# Patient Record
Sex: MALE | Race: BLACK OR AFRICAN AMERICAN | NOT HISPANIC OR LATINO | URBAN - METROPOLITAN AREA
[De-identification: names, ages, dates, MRNs, and addresses within clinical notes are randomized per-mention and may not be internally consistent; named-entity substitution may affect disease eponyms.]

---

## 2022-07-11 ENCOUNTER — HOSPITAL ENCOUNTER (EMERGENCY)
Facility: HOSPITAL | Age: 32
Discharge: HOME | End: 2022-07-11
Attending: STUDENT IN AN ORGANIZED HEALTH CARE EDUCATION/TRAINING PROGRAM

## 2022-07-11 VITALS
DIASTOLIC BLOOD PRESSURE: 91 MMHG | HEIGHT: 66 IN | RESPIRATION RATE: 16 BRPM | BODY MASS INDEX: 27.32 KG/M2 | SYSTOLIC BLOOD PRESSURE: 151 MMHG | HEART RATE: 76 BPM | OXYGEN SATURATION: 99 % | TEMPERATURE: 98 F | WEIGHT: 170 LBS

## 2022-07-11 DIAGNOSIS — K04.7 DENTAL ABSCESS: Primary | ICD-10-CM

## 2022-07-11 PROCEDURE — 99284 EMERGENCY DEPT VISIT MOD MDM: CPT

## 2022-07-11 RX ORDER — PENICILLIN V POTASSIUM 500 MG/1
500 TABLET, FILM COATED ORAL EVERY 6 HOURS
Qty: 28 TABLET | Refills: 0 | Status: SHIPPED | OUTPATIENT
Start: 2022-07-11 | End: 2023-01-14 | Stop reason: ALTCHOICE

## 2022-07-11 RX ORDER — HYDROCODONE BITARTRATE AND ACETAMINOPHEN 5; 325 MG/1; MG/1
1 TABLET ORAL EVERY 6 HOURS PRN
Qty: 12 TABLET | Refills: 0 | Status: SHIPPED | OUTPATIENT
Start: 2022-07-11 | End: 2023-01-14 | Stop reason: ALTCHOICE

## 2022-07-11 NOTE — ED NOTES
Pt states he has two top right dental abscesses and also c/o top left toothache, pt state he has not been able to eat x 2 days d/t the dental pain.

## 2022-07-11 NOTE — ED PROVIDER NOTES
Encounter Date: 7/11/2022          History     Chief Complaint   Patient presents with    Dental Pain     Papito upper dental pain     31 Male presents to Ed with pain to R upper teeth/jaw and L lower jaw. Saw a dentist 2-3 months ago and took PO abx , pain resolved shortly after and now has come back past 2-3 days. Needs t FU with dentist but n longer has insuranceTook ibuprofen 800mg without relief.        Review of patient's allergies indicates:  No Known Allergies  No past medical history on file.  No past surgical history on file.  No family history on file.     Review of Systems   Constitutional: Negative for fever.   HENT: Negative for sore throat.         Teeth/jaw pain   Respiratory: Negative for shortness of breath.    Cardiovascular: Negative for chest pain.   Gastrointestinal: Negative for nausea.   Genitourinary: Negative for dysuria.   Musculoskeletal: Negative for back pain.   Skin: Negative for rash.   Neurological: Negative for weakness.   Hematological: Does not bruise/bleed easily.       Physical Exam     Initial Vitals   BP Pulse Resp Temp SpO2   07/11/22 0852 07/11/22 0850 07/11/22 0850 07/11/22 0850 07/11/22 0850   (!) 151/91 76 16 98.4 °F (36.9 °C) 99 %      MAP       --                Physical Exam    Constitutional: He appears well-developed and well-nourished. Distressed: moderate , 2/2 pain.   HENT:   Head: Normocephalic and atraumatic.   Mouth/Throat: Dental caries present.   No obvious abscess however ttp to right upper gingiva, ttp to L lower gingiva, numerous teeth missing to upper and lower jaw, +cavities   Eyes: Conjunctivae and EOM are normal. Pupils are equal, round, and reactive to light.   Neck: Neck supple.   Normal range of motion.  Cardiovascular: Normal rate, regular rhythm, normal heart sounds and intact distal pulses.   No murmur heard.  Musculoskeletal:      Cervical back: Normal range of motion and neck supple.     Neurological: He is alert and oriented to person, place,  and time. He has normal strength. No sensory deficit.   Skin: Skin is warm and dry. Capillary refill takes less than 2 seconds.   Psychiatric: He has a normal mood and affect. His speech is normal. Judgment and thought content normal.         ED Course   Procedures  Labs Reviewed - No data to display       Imaging Results    None          Medications - No data to display  Medical Decision Making:   Differential Diagnosis:   Dental abscess, jaw abscess ginigivitis  ED Management:  CT head with contrast ordered to rule out any intracranial or bony abscess however patient refused due to concerns regarding cost.  Explained risks, benefits, alternatives to scan including risk of worsening  abscess and including spread to the brain causing serious infection and even death.  Patient understands all risks associated with not obtaining a CT and all questions were answered appropriately.                      Clinical Impression:   Final diagnoses:  [K04.7] Dental abscess (Primary)          ED Disposition Condition    Discharge Stable        ED Prescriptions     Medication Sig Dispense Start Date End Date Auth. Provider    HYDROcodone-acetaminophen (NORCO) 5-325 mg per tablet Take 1 tablet by mouth every 6 (six) hours as needed for Pain. 12 tablet 7/11/2022  Jadyn Wagner MD    penicillin v potassium (VEETID) 500 MG tablet Take 1 tablet (500 mg total) by mouth every 6 (six) hours. 28 tablet 7/11/2022  Jadyn Wagner MD        Follow-up Information     Follow up With Specialties Details Why Contact Info    FU w/ Dentist    2-3 days           Jadyn Wagner MD  07/13/22 7128

## 2022-10-09 ENCOUNTER — HOSPITAL ENCOUNTER (EMERGENCY)
Facility: HOSPITAL | Age: 32
Discharge: HOME | End: 2022-10-09

## 2022-10-09 VITALS
HEIGHT: 66 IN | WEIGHT: 183.44 LBS | BODY MASS INDEX: 29.48 KG/M2 | RESPIRATION RATE: 18 BRPM | DIASTOLIC BLOOD PRESSURE: 93 MMHG | SYSTOLIC BLOOD PRESSURE: 147 MMHG | TEMPERATURE: 98 F | OXYGEN SATURATION: 99 % | HEART RATE: 90 BPM

## 2022-10-09 DIAGNOSIS — H20.9 UVEITIS OF LEFT EYE: Primary | ICD-10-CM

## 2022-10-09 PROCEDURE — 99284 EMERGENCY DEPT VISIT MOD MDM: CPT

## 2022-10-09 PROCEDURE — 25000003 PHARM REV CODE 250: Performed by: NURSE PRACTITIONER

## 2022-10-09 RX ORDER — PREDNISONE 20 MG/1
40 TABLET ORAL DAILY
Qty: 10 TABLET | Refills: 0 | Status: SHIPPED | OUTPATIENT
Start: 2022-10-09 | End: 2022-10-14

## 2022-10-09 RX ORDER — HYDROCODONE BITARTRATE AND ACETAMINOPHEN 10; 325 MG/1; MG/1
1 TABLET ORAL
Status: COMPLETED | OUTPATIENT
Start: 2022-10-09 | End: 2022-10-09

## 2022-10-09 RX ORDER — HYDROCODONE BITARTRATE AND ACETAMINOPHEN 5; 325 MG/1; MG/1
1 TABLET ORAL EVERY 4 HOURS PRN
Qty: 18 TABLET | Refills: 0 | Status: SHIPPED | OUTPATIENT
Start: 2022-10-09 | End: 2023-01-14 | Stop reason: ALTCHOICE

## 2022-10-09 RX ADMIN — HYDROCODONE BITARTRATE AND ACETAMINOPHEN 1 TABLET: 10; 325 TABLET ORAL at 08:10

## 2022-10-09 NOTE — Clinical Note
"Kvng Palacioa" Chevalier was seen and treated in our emergency department on 10/9/2022.  He may return to work on 10/12/2022.       If you have any questions or concerns, please don't hesitate to call.      AURORA Fernandez RN    "

## 2022-10-10 NOTE — ED PROVIDER NOTES
Encounter Date: 10/9/2022       History     Chief Complaint   Patient presents with    Eye Pain     Pt states his left eye started swelling x2 days. Used drops from a friend yesterday but its worse today. Left ear is also swollen.     C/o lt redness, pain and drainage, lt mastoid pain and mild swelling    Review of patient's allergies indicates:  No Known Allergies  No past medical history on file.  No past surgical history on file.  No family history on file.     Review of Systems   Eyes:  Positive for pain, discharge and redness.   All other systems reviewed and are negative.    Physical Exam     Initial Vitals [10/09/22 2001]   BP Pulse Resp Temp SpO2   (!) 147/93 90 18 97.9 °F (36.6 °C) 99 %      MAP       --         Physical Exam    Nursing note and vitals reviewed.  Constitutional: He appears well-developed and well-nourished.   HENT:   Head: Normocephalic and atraumatic.   Eyes:   Lt eye moderate erythema and clear constant drainage, lt preauricular mild swelling, moderate tenderness  Rt eye wnl   Neck: Neck supple.   Normal range of motion.  Cardiovascular:  Normal rate and regular rhythm.           Pulmonary/Chest: Breath sounds normal.   Musculoskeletal:         General: Normal range of motion.      Cervical back: Normal range of motion and neck supple.     Neurological: He is alert and oriented to person, place, and time.   Skin: Skin is warm and dry. Capillary refill takes 2 to 3 seconds.   Psychiatric: He has a normal mood and affect.       ED Course   Procedures  Labs Reviewed - No data to display       Imaging Results    None          Medications   HYDROcodone-acetaminophen  mg per tablet 1 tablet (has no administration in time range)                              Clinical Impression:   Final diagnoses:  [H20.9] Uveitis of left eye (Primary)      ED Disposition Condition    Discharge Stable          ED Prescriptions       Medication Sig Dispense Start Date End Date Auth. Provider    predniSONE  (DELTASONE) 20 MG tablet Take 2 tablets (40 mg total) by mouth once daily. for 5 days 10 tablet 10/9/2022 10/14/2022 JAI Gill    HYDROcodone-acetaminophen (NORCO) 5-325 mg per tablet Take 1 tablet by mouth every 4 (four) hours as needed for Pain. 18 tablet 10/9/2022 -- JAI Gill          Follow-up Information       Follow up With Specialties Details Why Contact Info    opthalmology  In 1 day               JAI Gill  10/09/22 2022

## 2023-01-14 ENCOUNTER — HOSPITAL ENCOUNTER (EMERGENCY)
Facility: HOSPITAL | Age: 33
Discharge: HOME | End: 2023-01-14
Attending: EMERGENCY MEDICINE

## 2023-01-14 VITALS
HEART RATE: 94 BPM | TEMPERATURE: 99 F | SYSTOLIC BLOOD PRESSURE: 146 MMHG | RESPIRATION RATE: 18 BRPM | HEIGHT: 66 IN | WEIGHT: 170 LBS | BODY MASS INDEX: 27.32 KG/M2 | DIASTOLIC BLOOD PRESSURE: 86 MMHG | OXYGEN SATURATION: 96 %

## 2023-01-14 DIAGNOSIS — L02.91 CUTANEOUS ABSCESS, UNSPECIFIED SITE: ICD-10-CM

## 2023-01-14 DIAGNOSIS — K04.7 DENTAL ABSCESS: Primary | ICD-10-CM

## 2023-01-14 PROBLEM — Z72.0 TOBACCO USER: Status: ACTIVE | Noted: 2023-01-14

## 2023-01-14 PROBLEM — E66.9 OBESITY: Status: ACTIVE | Noted: 2023-01-14

## 2023-01-14 PROCEDURE — 99284 EMERGENCY DEPT VISIT MOD MDM: CPT

## 2023-01-14 RX ORDER — HYDROCODONE BITARTRATE AND ACETAMINOPHEN 5; 325 MG/1; MG/1
1 TABLET ORAL EVERY 6 HOURS PRN
Qty: 12 TABLET | Refills: 0 | OUTPATIENT
Start: 2023-01-14 | End: 2024-03-17

## 2023-01-14 RX ORDER — CLINDAMYCIN HYDROCHLORIDE 150 MG/1
300 CAPSULE ORAL 4 TIMES DAILY
Qty: 56 CAPSULE | Refills: 0 | Status: SHIPPED | OUTPATIENT
Start: 2023-01-14 | End: 2023-01-21

## 2023-01-14 NOTE — ED PROVIDER NOTES
"Encounter Date: 1/14/2023       History     Chief Complaint   Patient presents with    Dental Pain     Complains of dental abscess to right upper mouth x 2 days. Also reports he has an abscess "that busted" and is painful. Also reports an abscess on his left buttock. Reports he was prescribed Ibuprofen 800mg but is not helping     This 32-year-old presents with complaints of a dental abscess in the right upper mouth for the past 2 days. He also reports that he had an abscess on the back of his right calf which busted but he does not feel like it is completely drained.  Also reports an abscess on his left buttock. States he has 800 mg ibuprofen is given from his dad but they do not help.    Review of patient's allergies indicates:  No Known Allergies  History reviewed. No pertinent past medical history.  History reviewed. No pertinent surgical history.  History reviewed. No pertinent family history.  Social History     Tobacco Use    Smoking status: Every Day     Types: Cigarettes    Smokeless tobacco: Never     Review of Systems   Constitutional:  Negative for appetite change.     Physical Exam     Initial Vitals [01/14/23 1602]   BP Pulse Resp Temp SpO2   (!) 146/86 94 18 98.5 °F (36.9 °C) 96 %      MAP       --         Physical Exam    Constitutional: He appears well-developed and well-nourished.   HENT:   Head: Normocephalic and atraumatic.   Mouth/Throat: Mucous membranes are normal.   There is a small abscess over the right upper incisor.   Eyes: EOM are normal. Pupils are equal, round, and reactive to light.   Neck: Neck supple.   Normal range of motion.  Cardiovascular:  Normal rate, regular rhythm, normal heart sounds and intact distal pulses.           Pulmonary/Chest: Breath sounds normal.   Abdominal: Abdomen is soft. Bowel sounds are normal.   Musculoskeletal:         General: Normal range of motion.      Cervical back: Normal range of motion and neck supple.     Neurological: He is alert and oriented to " person, place, and time. He has normal strength.   Skin: Skin is warm and dry. Capillary refill takes less than 2 seconds.   There is a small healing head on the right calf that appears to be a drained abscess.  There is also a small abscess on the left buttock that is not appear ready for incision and drainage.   Psychiatric: He has a normal mood and affect. His behavior is normal. Judgment and thought content normal.       ED Course   Procedures  Labs Reviewed - No data to display       Imaging Results    None          Medications - No data to display                           Clinical Impression:   Final diagnoses:  [K04.7] Dental abscess (Primary)  [L02.91] Cutaneous abscess, unspecified site        ED Disposition Condition    Discharge Stable          ED Prescriptions       Medication Sig Dispense Start Date End Date Auth. Provider    clindamycin (CLEOCIN) 150 MG capsule Take 2 capsules (300 mg total) by mouth 4 (four) times daily. for 7 days 56 capsule 1/14/2023 1/21/2023 Stephan Bernard MD    HYDROcodone-acetaminophen (NORCO) 5-325 mg per tablet Take 1 tablet by mouth every 6 (six) hours as needed for Pain. 12 tablet 1/14/2023 -- Stephan Bernard MD          Follow-up Information       Follow up With Specialties Details Why Contact Info    follow up with the dentist you have seen already.                 Stephan Bernard MD  01/14/23 6546

## 2023-06-27 ENCOUNTER — HOSPITAL ENCOUNTER (EMERGENCY)
Facility: HOSPITAL | Age: 33
Discharge: HOME | End: 2023-06-27
Attending: EMERGENCY MEDICINE

## 2023-06-27 VITALS
WEIGHT: 171 LBS | RESPIRATION RATE: 18 BRPM | TEMPERATURE: 98 F | HEIGHT: 66 IN | BODY MASS INDEX: 27.48 KG/M2 | DIASTOLIC BLOOD PRESSURE: 99 MMHG | OXYGEN SATURATION: 99 % | SYSTOLIC BLOOD PRESSURE: 164 MMHG | HEART RATE: 85 BPM

## 2023-06-27 DIAGNOSIS — K08.89 PAIN, DENTAL: Primary | ICD-10-CM

## 2023-06-27 PROCEDURE — 25000003 PHARM REV CODE 250

## 2023-06-27 PROCEDURE — 99283 EMERGENCY DEPT VISIT LOW MDM: CPT

## 2023-06-27 RX ORDER — KETOROLAC TROMETHAMINE 10 MG/1
10 TABLET, FILM COATED ORAL
Status: COMPLETED | OUTPATIENT
Start: 2023-06-27 | End: 2023-06-27

## 2023-06-27 RX ORDER — KETOROLAC TROMETHAMINE 10 MG/1
10 TABLET, FILM COATED ORAL
Status: DISCONTINUED | OUTPATIENT
Start: 2023-06-27 | End: 2023-06-27

## 2023-06-27 RX ORDER — CLINDAMYCIN HYDROCHLORIDE 150 MG/1
300 CAPSULE ORAL 4 TIMES DAILY
Qty: 56 CAPSULE | Refills: 0 | Status: SHIPPED | OUTPATIENT
Start: 2023-06-27 | End: 2023-07-04

## 2023-06-27 RX ADMIN — KETOROLAC TROMETHAMINE 10 MG: 10 TABLET, FILM COATED ORAL at 06:06

## 2023-06-27 NOTE — ED PROVIDER NOTES
Encounter Date: 6/27/2023       History     Chief Complaint   Patient presents with    Dental Pain     Dental pain x 2 days. Affected tooth bottom left and top right     32-year-old male presents with abscess to the left      Review of patient's allergies indicates:  No Known Allergies  No past medical history on file.  No past surgical history on file.  No family history on file.  Social History     Tobacco Use    Smoking status: Every Day     Types: Cigarettes    Smokeless tobacco: Never     Review of Systems   HENT:  Positive for dental problem.    Eyes: Negative.    Respiratory: Negative.     Cardiovascular: Negative.    Gastrointestinal: Negative.    Endocrine: Negative.    Genitourinary: Negative.    Musculoskeletal: Negative.    Skin: Negative.    Hematological: Negative.    Psychiatric/Behavioral: Negative.     All other systems reviewed and are negative.    Physical Exam     Initial Vitals [06/27/23 1619]   BP Pulse Resp Temp SpO2   (!) 164/99 85 18 98.3 °F (36.8 °C) 99 %      MAP       --         Physical Exam    Nursing note and vitals reviewed.  Constitutional: He appears well-developed and well-nourished.   HENT:   Head: Normocephalic and atraumatic.   Right Ear: External ear normal.   Left Ear: External ear normal.   Nose: Nose normal.   Mouth/Throat: Oropharynx is clear and moist.   Eyes: Conjunctivae and EOM are normal. Pupils are equal, round, and reactive to light.   Neck: Neck supple.   Normal range of motion.  Cardiovascular:  Normal rate, regular rhythm, normal heart sounds and intact distal pulses.           Pulmonary/Chest: Breath sounds normal.   Abdominal: Abdomen is soft. Bowel sounds are normal.   Musculoskeletal:         General: Normal range of motion.      Cervical back: Normal range of motion and neck supple.     Neurological: He is alert and oriented to person, place, and time. He has normal strength and normal reflexes. GCS score is 15. GCS eye subscore is 4. GCS verbal subscore is  5. GCS motor subscore is 6.   Skin: Skin is warm and dry. Capillary refill takes less than 2 seconds.   Psychiatric: He has a normal mood and affect. His behavior is normal. Judgment and thought content normal.       ED Course   Procedures  Labs Reviewed - No data to display       Imaging Results    None          Medications   ketorolac tablet 10 mg (has no administration in time range)     Medical Decision Making:   Initial Assessment:   Awake alert and oriented 32-year-old male ambulatory gait steady presents to the ER with abscess to right upper gumline and left lower molar.  Patient had frequent visits to the ER and has not seen a dentist for it.  Patient  does smoke.  Tenderness and redness noted to the upper right side of the jaw.  A cracked tooth 2 to the left lower jaw.  No drainage noted.  Poor dentition.  Afebrile.  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  Otherwise mucous membranes moist posterior oropharynx clear without any exudate.  All other review of systems within normal limits.  Patient is GCS 15 cranial nerves 2-12 intact.  Differential Diagnosis:   Tooth abscess  Avulsion  Dentition.  ED Management:  Patient will be discharged home patient will be prescribed another antibiotic and an anti-inflammatory.                        Clinical Impression:   Final diagnoses:  [K08.89] Pain, dental (Primary)        ED Disposition Condition    Discharge Stable          ED Prescriptions    None       Follow-up Information    None          Anabel Ivy NP  06/27/23 3260

## 2023-06-27 NOTE — DISCHARGE INSTRUCTIONS
Patient will be discharged home.  Encouraged to follow up with a dentist after this visit and round of antibiotics.  Oral mouthwash swish and swallow.  Follow up with your primary care provider for additional needs.

## 2023-10-06 ENCOUNTER — HOSPITAL ENCOUNTER (EMERGENCY)
Facility: HOSPITAL | Age: 33
Discharge: HOME | End: 2023-10-06
Attending: STUDENT IN AN ORGANIZED HEALTH CARE EDUCATION/TRAINING PROGRAM

## 2023-10-06 VITALS
OXYGEN SATURATION: 99 % | BODY MASS INDEX: 28.25 KG/M2 | TEMPERATURE: 99 F | DIASTOLIC BLOOD PRESSURE: 90 MMHG | HEART RATE: 88 BPM | SYSTOLIC BLOOD PRESSURE: 158 MMHG | RESPIRATION RATE: 18 BRPM | WEIGHT: 175 LBS

## 2023-10-06 DIAGNOSIS — U07.1 COVID-19: Primary | ICD-10-CM

## 2023-10-06 LAB
FLUAV AG UPPER RESP QL IA.RAPID: NOT DETECTED
FLUBV AG UPPER RESP QL IA.RAPID: NOT DETECTED
SARS-COV-2 RNA RESP QL NAA+PROBE: DETECTED

## 2023-10-06 PROCEDURE — 99283 EMERGENCY DEPT VISIT LOW MDM: CPT

## 2023-10-06 PROCEDURE — 0240U COVID/FLU A&B PCR: CPT | Performed by: STUDENT IN AN ORGANIZED HEALTH CARE EDUCATION/TRAINING PROGRAM

## 2023-10-06 NOTE — Clinical Note
"Kvng "Devonta" Chevalier was seen and treated in our emergency department on 10/6/2023.     COVID-19 is present in our communities across the state. There is limited testing for COVID at this time, so not all patients can be tested. In this situation, your employee meets the following criteria:    Devonta Chevalier has met the criteria for COVID-19 testing and has a POSITIVE result. He can return to work once they are asymptomatic for 24 hours without the use of fever reducing medications AND at least five days from the first positive result. A mask is recommended for 5 days post quarantine.     If you have any questions or concerns, or if I can be of further assistance, please do not hesitate to contact me.    Sincerely,             MAI Denton RN RN"

## 2023-10-06 NOTE — Clinical Note
"Kvng Blasonta" Chevalier was seen and treated in our emergency department on 10/6/2023.  He may return to work on 10/11/2023.       If you have any questions or concerns, please don't hesitate to call.      Edilberto Snow MD"

## 2023-10-07 NOTE — ED PROVIDER NOTES
Encounter Date: 10/6/2023       History     Chief Complaint   Patient presents with    Generalized Body Aches     Body aches and nasal congestion since last night. States has to work tonight and wants to get checked     Patient is a 32-year-old  male with no significant past medical history presents to the ER today due to generalized myalgias.  Patient states he in get tested for COVID patient states since onset was yesterday.  He denies any fevers, chest pain, shortness of breath abdominal pain      Review of patient's allergies indicates:  No Known Allergies  No past medical history on file.  No past surgical history on file.  No family history on file.  Social History     Tobacco Use    Smoking status: Every Day     Types: Cigarettes    Smokeless tobacco: Never     Review of Systems   Constitutional:  Negative for chills, fatigue and fever.   HENT:  Negative for congestion, sore throat and trouble swallowing.    Eyes:  Negative for pain and visual disturbance.   Respiratory:  Negative for cough, shortness of breath and wheezing.    Cardiovascular:  Negative for chest pain and palpitations.   Gastrointestinal:  Negative for abdominal pain, blood in stool, constipation, diarrhea, nausea and vomiting.   Genitourinary:  Negative for dysuria and hematuria.   Musculoskeletal:  Positive for myalgias. Negative for back pain.   Skin:  Negative for rash and wound.   Neurological:  Negative for seizures, syncope and headaches.   Psychiatric/Behavioral:  Negative for confusion. The patient is not nervous/anxious.        Physical Exam     Initial Vitals [10/06/23 2039]   BP Pulse Resp Temp SpO2   (!) 158/90 88 18 98.5 °F (36.9 °C) 99 %      MAP       --         Physical Exam    Nursing note and vitals reviewed.  Constitutional: He appears well-developed and well-nourished. No distress.   HENT:   Head: Normocephalic and atraumatic.   Eyes: Conjunctivae and EOM are normal. Right eye exhibits no discharge. Left  eye exhibits no discharge. No scleral icterus.   Neck: No tracheal deviation present.   Normal range of motion.  Cardiovascular:  Normal rate, regular rhythm and normal heart sounds.     Exam reveals no gallop and no friction rub.       No murmur heard.  Pulmonary/Chest: Breath sounds normal. No respiratory distress. He has no wheezes. He has no rhonchi. He has no rales.   Musculoskeletal:         General: No edema. Normal range of motion.      Cervical back: Normal range of motion.     Neurological: He is alert.   Skin: Skin is warm and dry. No rash and no abscess noted. No erythema. No pallor.   Psychiatric: His behavior is normal. Judgment normal.         ED Course   Procedures  Labs Reviewed   COVID/FLU A&B PCR - Abnormal; Notable for the following components:       Result Value    SARS-CoV-2 PCR Detected (*)     All other components within normal limits    Narrative:     The Xpert Xpress SARS-CoV-2/FLU/RSV plus is a rapid, multiplexed real-time PCR test intended for the simultaneous qualitative detection and differentiation of SARS-CoV-2, Influenza A, Influenza B, and respiratory syncytial virus (RSV) viral RNA in either nasopharyngeal swab or nasal swab specimens.                Imaging Results    None          Medications - No data to display  Medical Decision Making  Differentials, viral infection, COVID, flu   32-year-old well-appearing male with stable vital signs and afebrile presents with generalized myalgias.  Patient needs past Friday returning to work.  Positive flu test negative.  Symptomatic care was discussed CDC guidelines regarding COVID were discussed.  Work excuse was provided.  All questions answered in layman's terms options    Amount and/or Complexity of Data Reviewed  Labs: ordered. Decision-making details documented in ED Course.                               Clinical Impression:   Final diagnoses:  [U07.1] COVID-19 (Primary)        ED Disposition Condition    Discharge Stable          ED  Prescriptions    None       Follow-up Information       Follow up With Specialties Details Why Contact Info    [facility] - Emergency Dept Emergency Medicine  If symptoms worsen [address] [facility] [id number] [phone]             Edilberto Snow MD  10/06/23 2390

## 2024-03-17 ENCOUNTER — HOSPITAL ENCOUNTER (EMERGENCY)
Facility: HOSPITAL | Age: 34
Discharge: HOME | End: 2024-03-17
Attending: EMERGENCY MEDICINE

## 2024-03-17 VITALS
BODY MASS INDEX: 28.32 KG/M2 | RESPIRATION RATE: 20 BRPM | HEART RATE: 75 BPM | HEIGHT: 65 IN | SYSTOLIC BLOOD PRESSURE: 144 MMHG | WEIGHT: 170 LBS | OXYGEN SATURATION: 100 % | TEMPERATURE: 98 F | DIASTOLIC BLOOD PRESSURE: 92 MMHG

## 2024-03-17 DIAGNOSIS — K04.7 DENTAL ABSCESS: Primary | ICD-10-CM

## 2024-03-17 PROCEDURE — 99284 EMERGENCY DEPT VISIT MOD MDM: CPT

## 2024-03-17 RX ORDER — HYDROCODONE BITARTRATE AND ACETAMINOPHEN 5; 325 MG/1; MG/1
1 TABLET ORAL EVERY 6 HOURS PRN
Qty: 12 TABLET | Refills: 0 | Status: SHIPPED | OUTPATIENT
Start: 2024-03-17

## 2024-03-17 RX ORDER — AMOXICILLIN 875 MG/1
875 TABLET, FILM COATED ORAL 2 TIMES DAILY
Qty: 14 TABLET | Refills: 0 | Status: SHIPPED | OUTPATIENT
Start: 2024-03-17

## 2024-03-17 NOTE — Clinical Note
"Kvng Victor" Chevalier was seen and treated in our emergency department on 3/17/2024.  He may return to work on 03/18/2024.       If you have any questions or concerns, please don't hesitate to call.       RN    "

## 2024-04-01 ENCOUNTER — HOSPITAL ENCOUNTER (EMERGENCY)
Facility: HOSPITAL | Age: 34
Discharge: HOME | End: 2024-04-01
Attending: STUDENT IN AN ORGANIZED HEALTH CARE EDUCATION/TRAINING PROGRAM

## 2024-04-01 VITALS
SYSTOLIC BLOOD PRESSURE: 142 MMHG | HEART RATE: 83 BPM | OXYGEN SATURATION: 100 % | DIASTOLIC BLOOD PRESSURE: 95 MMHG | WEIGHT: 170 LBS | RESPIRATION RATE: 18 BRPM | HEIGHT: 66 IN | TEMPERATURE: 99 F | BODY MASS INDEX: 27.32 KG/M2

## 2024-04-01 DIAGNOSIS — J06.9 VIRAL URI WITH COUGH: Primary | ICD-10-CM

## 2024-04-01 LAB
FLUAV AG UPPER RESP QL IA.RAPID: NOT DETECTED
FLUBV AG UPPER RESP QL IA.RAPID: NOT DETECTED
RSV A 5' UTR RNA NPH QL NAA+PROBE: NOT DETECTED
SARS-COV-2 RNA RESP QL NAA+PROBE: NOT DETECTED
STREP A PCR (OHS): NOT DETECTED

## 2024-04-01 PROCEDURE — 87651 STREP A DNA AMP PROBE: CPT | Performed by: STUDENT IN AN ORGANIZED HEALTH CARE EDUCATION/TRAINING PROGRAM

## 2024-04-01 PROCEDURE — 0241U COVID/RSV/FLU A&B PCR: CPT | Performed by: STUDENT IN AN ORGANIZED HEALTH CARE EDUCATION/TRAINING PROGRAM

## 2024-04-01 PROCEDURE — 99282 EMERGENCY DEPT VISIT SF MDM: CPT

## 2024-04-01 NOTE — ED PROVIDER NOTES
Encounter Date: 4/1/2024       History     Chief Complaint   Patient presents with    Sore Throat     Sore throat, cough and chills. Started yesterday.     Cough    Chills     Patient is a 33-year-old  male with no significant past medical history presented with his significant other for sinus congestion, sore throat and nonproductive cough.  He states he has had subjective fevers but never checked his temperature.  He has not taken anything for symptoms.  Denies any chest pain, shortness of breath or abdominal pain.  Patient would like to be tested for strep.      Review of patient's allergies indicates:  No Known Allergies  No past medical history on file.  No past surgical history on file.  No family history on file.  Social History     Tobacco Use    Smoking status: Every Day     Types: Cigarettes    Smokeless tobacco: Never     Review of Systems   Constitutional:  Positive for chills. Negative for fatigue and fever.   HENT:  Positive for congestion and sore throat. Negative for trouble swallowing.    Eyes:  Negative for pain and visual disturbance.   Respiratory:  Positive for cough. Negative for shortness of breath and wheezing.    Cardiovascular:  Negative for chest pain and palpitations.   Gastrointestinal:  Negative for abdominal pain, blood in stool, constipation, diarrhea, nausea and vomiting.   Genitourinary:  Negative for dysuria and hematuria.   Musculoskeletal:  Negative for back pain and myalgias.   Skin:  Negative for rash and wound.   Neurological:  Negative for seizures, syncope and headaches.   Psychiatric/Behavioral:  Negative for confusion. The patient is not nervous/anxious.        Physical Exam     Initial Vitals [04/01/24 1612]   BP Pulse Resp Temp SpO2   (!) 142/95 83 18 98.5 °F (36.9 °C) 100 %      MAP       --         Physical Exam    Nursing note and vitals reviewed.  Constitutional: He appears well-developed and well-nourished. No distress.   HENT:   Head: Normocephalic  and atraumatic.   Mouth/Throat: No oropharyngeal exudate.   Eyes: Conjunctivae and EOM are normal. Right eye exhibits no discharge. Left eye exhibits no discharge. No scleral icterus.   Neck: No tracheal deviation present.   Normal range of motion.  Cardiovascular:  Normal rate, regular rhythm and normal heart sounds.     Exam reveals no gallop and no friction rub.       No murmur heard.  Pulmonary/Chest: Breath sounds normal. No respiratory distress. He has no wheezes. He has no rhonchi. He has no rales.   Musculoskeletal:         General: No edema. Normal range of motion.      Cervical back: Normal range of motion.     Neurological: He is alert.   Skin: Skin is warm and dry. No rash and no abscess noted. No erythema. No pallor.   Psychiatric: His behavior is normal. Judgment normal.         ED Course   Procedures  Labs Reviewed   COVID/RSV/FLU A&B PCR - Normal    Narrative:     The Xpert Xpress SARS-CoV-2/FLU/RSV plus is a rapid, multiplexed real-time PCR test intended for the simultaneous qualitative detection and differentiation of SARS-CoV-2, Influenza A, Influenza B, and respiratory syncytial virus (RSV) viral RNA in either nasopharyngeal swab or nasal swab specimens.         STREP GROUP A BY PCR - Normal    Narrative:     The Xpert Xpress Strep A test is a rapid, qualitative in vitro diagnostic test for the detection of Streptococcus pyogenes (Group A ß-hemolytic Streptococcus, Strep A) in throat swab specimens from patients with signs and symptoms of pharyngitis.            Imaging Results    None          Medications - No data to display  Medical Decision Making  Differentials:  COVID, flu, strep, viral pharyngitis  History in his the patient   33-year-old well-appearing male with stable vital signs presents with sore throat and upper respiratory type complaints.  COVID/flu/strep tests were negative.  Viral syndrome is suspected.  Symptomatic care was discussed.  All questions answered in layman's terms and  return precautions were discussed.    Amount and/or Complexity of Data Reviewed  Labs: ordered. Decision-making details documented in ED Course.                                      Clinical Impression:  Final diagnoses:  [J06.9] Viral URI with cough (Primary)          ED Disposition Condition    Discharge Stable          ED Prescriptions    None       Follow-up Information       Follow up With Specialties Details Why Contact Info    [facility] - Emergency Dept Emergency Medicine  If symptoms worsen [address] [facility] [id number] [phone]             Edilberto Snow MD  04/01/24 7708

## 2025-04-07 ENCOUNTER — HOSPITAL ENCOUNTER (EMERGENCY)
Facility: HOSPITAL | Age: 35
Discharge: HOME | End: 2025-04-07
Attending: EMERGENCY MEDICINE
Payer: COMMERCIAL

## 2025-04-07 VITALS
OXYGEN SATURATION: 100 % | BODY MASS INDEX: 27.32 KG/M2 | DIASTOLIC BLOOD PRESSURE: 89 MMHG | TEMPERATURE: 98 F | WEIGHT: 170 LBS | RESPIRATION RATE: 16 BRPM | SYSTOLIC BLOOD PRESSURE: 128 MMHG | HEART RATE: 80 BPM | HEIGHT: 66 IN

## 2025-04-07 DIAGNOSIS — J02.9 SORE THROAT: ICD-10-CM

## 2025-04-07 DIAGNOSIS — R59.0 REACTIVE CERVICAL LYMPHADENOPATHY: Primary | ICD-10-CM

## 2025-04-07 DIAGNOSIS — J03.00 ACUTE NON-RECURRENT STREPTOCOCCAL TONSILLITIS: ICD-10-CM

## 2025-04-07 LAB — STREP A PCR (OHS): DETECTED

## 2025-04-07 PROCEDURE — 99284 EMERGENCY DEPT VISIT MOD MDM: CPT | Mod: 25

## 2025-04-07 PROCEDURE — 87651 STREP A DNA AMP PROBE: CPT | Performed by: EMERGENCY MEDICINE

## 2025-04-07 PROCEDURE — 63600175 PHARM REV CODE 636 W HCPCS: Performed by: EMERGENCY MEDICINE

## 2025-04-07 PROCEDURE — 25000003 PHARM REV CODE 250: Performed by: EMERGENCY MEDICINE

## 2025-04-07 PROCEDURE — 96372 THER/PROPH/DIAG INJ SC/IM: CPT | Performed by: EMERGENCY MEDICINE

## 2025-04-07 RX ORDER — IBUPROFEN 600 MG/1
600 TABLET ORAL EVERY 6 HOURS PRN
Qty: 20 TABLET | Refills: 0 | Status: SHIPPED | OUTPATIENT
Start: 2025-04-07

## 2025-04-07 RX ORDER — AMOXICILLIN 500 MG/1
1000 CAPSULE ORAL
Status: COMPLETED | OUTPATIENT
Start: 2025-04-07 | End: 2025-04-07

## 2025-04-07 RX ORDER — AMOXICILLIN 875 MG/1
875 TABLET, FILM COATED ORAL 2 TIMES DAILY
Qty: 20 TABLET | Refills: 0 | Status: SHIPPED | OUTPATIENT
Start: 2025-04-07

## 2025-04-07 RX ORDER — IBUPROFEN 800 MG/1
800 TABLET ORAL
Status: COMPLETED | OUTPATIENT
Start: 2025-04-07 | End: 2025-04-07

## 2025-04-07 RX ORDER — BETAMETHASONE SODIUM PHOSPHATE AND BETAMETHASONE ACETATE 3; 3 MG/ML; MG/ML
12 INJECTION, SUSPENSION INTRA-ARTICULAR; INTRALESIONAL; INTRAMUSCULAR; SOFT TISSUE
Status: COMPLETED | OUTPATIENT
Start: 2025-04-07 | End: 2025-04-07

## 2025-04-07 RX ADMIN — BETAMETHASONE SODIUM PHOSPHATE AND BETAMETHASONE ACETATE 12 MG: 3; 3 INJECTION, SUSPENSION INTRA-ARTICULAR; INTRALESIONAL; INTRAMUSCULAR at 06:04

## 2025-04-07 RX ADMIN — IBUPROFEN 800 MG: 800 TABLET, FILM COATED ORAL at 06:04

## 2025-04-07 RX ADMIN — AMOXICILLIN 1000 MG: 500 CAPSULE ORAL at 07:04

## 2025-04-07 NOTE — ED PROVIDER NOTES
Encounter Date: 4/7/2025      History     Chief Complaint   Patient presents with    Nasal Congestion     C/o nasal congestion, sore throat, sweats, loose stools since sat not responding to theraflu, cough drops, or thad seltzer cold.       HPI: Please see MDM    Past Medical History:  He  has no past medical history on file.    Past Surgical History:  He  has no past surgical history on file.    Allergies:  Review of patient's allergies indicates:  No Known Allergies    Family History:  His family history is not on file.    Social History:  He  reports that he has been smoking cigarettes. He has never used smokeless tobacco.    Home Medications:  He has a current medication list which includes the following prescription(s): amoxicillin, amoxicillin, hydrocodone-acetaminophen, and ibuprofen, and the following Facility-Administered Medications: amoxicillin.     ROS  Pertinent positives and negatives listed in HPI. All other systems are reviewed and are negative.    Physical Exam     Initial Vitals [04/07/25 0600]   BP Pulse Resp Temp SpO2   128/89 80 16 97.9 °F (36.6 °C) 100 %      MAP       --         General: No acute distress  HEENT: Normocephalic, atraumatic. Face symmetric.  There was bilateral posterior tonsillar erythema with some scant exudate on the right tonsil.  There is a tender 2 cm anterior cervical lymph node present  Cardiovascular: Regular rate & rhythm  Pulmonary: Bilateral symmetric chest rise, Non-labored.    Abdominal:  nondistended  Extremities: No clubbing or cyanosis.  Skin:  Exposed skin is warm & dry.  Neuro:   Patient moves all extremities equally. Sensation intact bilateraly.    ED Course   Procedures  Labs Reviewed   STREP GROUP A BY PCR - Abnormal       Result Value    STREP A PCR ([facility]) Detected (*)     Narrative:     The Xpert Xpress Strep A test is a rapid, qualitative in vitro diagnostic test for the detection of Streptococcus pyogenes (Group A ß-hemolytic Streptococcus, Strep A) in  throat swab specimens from patients with signs and symptoms of pharyngitis.            Imaging Results    None          Medications   amoxicillin capsule 1,000 mg (has no administration in time range)   ibuprofen tablet 800 mg (800 mg Oral Given 4/7/25 0626)   betamethasone acetate-betamethasone sodium phosphate injection 12 mg (12 mg Intramuscular Given 4/7/25 0626)     Medical Decision Making  Risk  Prescription drug management.      Patient refuses testing for COVID-19 and influenza    Amount and/or Complexity of Data Reviewed  External Data Reviewed:  Notes, labs  Labs:  All labs that have been ordered have been reviewed and are documented in ED Course.  Radiology: All images that have been ordered have been reviewed and are documented in ED Course.         Ddx:  Strep Throat, COVID viral pharyngitis, mononucleosis, HIV, GC, Herpangina, Oral candida, diphtheria, among others      The patient is now resting comfortably, alert, and in no distress. The patient has a normal mental status and is neurologically intact.  His testing does reveal that he is positive for strep.  The patient appears well and is able to tolerate food or fluid by mouth, and there is no significant dehydration. There is no respiratory distress and no signs of systemic toxicity. The history, exam, diagnostic testing (if any), and current condition do not suggest peritonsillar abscess, retropharyngeal abscess, epiglottitis, Preston's angina, deep neck abscess, sepsis or other serious bacterial infection requiring further testing, treatment, consultation, or admission at this time. The vital signs have been stable. There is no significant trismus, asymmetry of the posterior oropharynx, or abnormal appearance of the uvula.  The patient's condition is stable and appropriate for discharge. As the discharge instructions indicate, the patient will pursue further outpatient evaluation with the primary care physician or another designated or consulting  physician.                            Clinical Impression:  Final diagnoses:  [R59.0] Reactive cervical lymphadenopathy (Primary)  [J02.9] Sore throat  [J03.00] Acute non-recurrent streptococcal tonsillitis                   Michele Buckley MD  04/07/25 0720

## 2025-04-07 NOTE — Clinical Note
"Kvng Palacioa" Chevalier was seen and treated in our emergency department on 4/7/2025.  He may return to work on 04/09/2025.       If you have any questions or concerns, please don't hesitate to call.      Dr. Buckley/PG RN    "

## 2025-04-07 NOTE — Clinical Note
"Kvng Victor" Chevalier was seen and treated in our emergency department on 4/7/2025.  He may return to work on 04/08/2025.       If you have any questions or concerns, please don't hesitate to call.      LEIGH RUSHING    "

## 2025-05-26 ENCOUNTER — HOSPITAL ENCOUNTER (EMERGENCY)
Facility: HOSPITAL | Age: 35
Discharge: HOME | End: 2025-05-26
Attending: STUDENT IN AN ORGANIZED HEALTH CARE EDUCATION/TRAINING PROGRAM
Payer: COMMERCIAL

## 2025-05-26 ENCOUNTER — HOSPITAL ENCOUNTER (EMERGENCY)
Facility: HOSPITAL | Age: 35
Discharge: HOME | End: 2025-05-26
Attending: EMERGENCY MEDICINE
Payer: COMMERCIAL

## 2025-05-26 VITALS
TEMPERATURE: 98 F | HEIGHT: 66 IN | OXYGEN SATURATION: 100 % | RESPIRATION RATE: 20 BRPM | BODY MASS INDEX: 26.52 KG/M2 | WEIGHT: 165 LBS | SYSTOLIC BLOOD PRESSURE: 155 MMHG | HEART RATE: 100 BPM | DIASTOLIC BLOOD PRESSURE: 86 MMHG

## 2025-05-26 VITALS
HEIGHT: 66 IN | BODY MASS INDEX: 24.11 KG/M2 | OXYGEN SATURATION: 96 % | DIASTOLIC BLOOD PRESSURE: 97 MMHG | WEIGHT: 150 LBS | HEART RATE: 93 BPM | SYSTOLIC BLOOD PRESSURE: 142 MMHG | RESPIRATION RATE: 18 BRPM | TEMPERATURE: 98 F

## 2025-05-26 DIAGNOSIS — M79.18 MUSCULOSKELETAL PAIN: ICD-10-CM

## 2025-05-26 DIAGNOSIS — M25.529 ELBOW PAIN: ICD-10-CM

## 2025-05-26 DIAGNOSIS — M25.522 ELBOW PAIN, LEFT: Primary | ICD-10-CM

## 2025-05-26 DIAGNOSIS — M25.539 WRIST PAIN: ICD-10-CM
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
## 2025-05-26 DIAGNOSIS — M79.602 LEFT ARM PAIN: ICD-10-CM

## 2025-05-26 DIAGNOSIS — M79.603 ARM PAIN: ICD-10-CM

## 2025-05-26 PROCEDURE — 99283 EMERGENCY DEPT VISIT LOW MDM: CPT | Mod: 25

## 2025-05-26 PROCEDURE — 99284 EMERGENCY DEPT VISIT MOD MDM: CPT | Mod: 25,27

## 2025-05-26 PROCEDURE — 25000003 PHARM REV CODE 250: Performed by: PHYSICIAN ASSISTANT

## 2025-05-26 RX ORDER — NAPROXEN 500 MG/1
500 TABLET ORAL 2 TIMES DAILY WITH MEALS
Qty: 20 TABLET | Refills: 0 | Status: SHIPPED | OUTPATIENT
Start: 2025-05-26

## 2025-05-26 RX ORDER — TRAMADOL HYDROCHLORIDE 50 MG/1
50 TABLET, FILM COATED ORAL EVERY 8 HOURS PRN
Qty: 9 TABLET | Refills: 0 | Status: SHIPPED | OUTPATIENT
Start: 2025-05-26

## 2025-05-26 RX ORDER — METHOCARBAMOL 750 MG/1
750 TABLET, FILM COATED ORAL 3 TIMES DAILY
Qty: 30 TABLET | Refills: 0 | Status: SHIPPED | OUTPATIENT
Start: 2025-05-26 | End: 2025-06-05

## 2025-05-26 RX ORDER — KETOROLAC TROMETHAMINE 30 MG/ML
30 INJECTION, SOLUTION INTRAMUSCULAR; INTRAVENOUS
Status: DISCONTINUED | OUTPATIENT
Start: 2025-05-26 | End: 2025-05-26

## 2025-05-26 RX ORDER — KETOROLAC TROMETHAMINE 10 MG/1
10 TABLET, FILM COATED ORAL
Status: COMPLETED | OUTPATIENT
Start: 2025-05-26 | End: 2025-05-26

## 2025-05-26 RX ADMIN — KETOROLAC TROMETHAMINE 10 MG: 10 TABLET, FILM COATED ORAL at 06:05

## 2025-05-27 NOTE — ED PROVIDER NOTES
Encounter Date: 5/26/2025       History     Chief Complaint   Patient presents with    Med Change     Patient requesting tramadol for pain medication now that xray has read a possible fracture in elbow and is uncomfortable with medication prescribed. Was seen in ED a few hours ago.      HPI  Patient is a 34-year-old male presents to ER complaining of left elbow pain.  Patient was seen earlier today, had x-ray left elbow which when read by Radiology stated: Cannot exclude very subtle nondisplaced fracture of the radial head with associated small elbow effusion.  Patient was called at home, and recommended he follow with his PCP for repeat x-ray, and reassessment.  Patient presenting back to ER stating of the medication that he was discharged with is not currently controlling his pain.  Wanting to switch medications at this time.  He denies any new injury to the left elbow or upper extremity.  Review of patient's allergies indicates:  No Known Allergies  No past medical history on file.  No past surgical history on file.  No family history on file.  Social History[1]  Review of Systems   Constitutional:  Negative for fever.   HENT:  Negative for sore throat.    Eyes:  Negative for visual disturbance.   Respiratory:  Negative for shortness of breath.    Cardiovascular:  Negative for chest pain.   Gastrointestinal:  Negative for nausea.   Endocrine: Negative for polyuria.   Genitourinary:  Negative for dysuria.   Musculoskeletal:  Positive for arthralgias. Negative for back pain.        Left elbow pain   Skin:  Negative for rash.   Neurological:  Negative for weakness.   Hematological:  Does not bruise/bleed easily.   All other systems reviewed and are negative.      Physical Exam     Initial Vitals [05/26/25 2211]   BP Pulse Resp Temp SpO2   (!) 142/97 93 18 97.8 °F (36.6 °C) 96 %      MAP       --         Physical Exam    Nursing note and vitals reviewed.  Constitutional: Vital signs are normal. He appears  well-developed and well-nourished. He is not diaphoretic. He is active.  Non-toxic appearance. He does not appear ill. No distress.   HENT:   Head: Normocephalic and atraumatic.   Eyes: Conjunctivae are normal. Pupils are equal, round, and reactive to light. Right conjunctiva is not injected. Left conjunctiva is not injected.   Neck: Trachea normal. Neck supple.   Normal range of motion.   Full passive range of motion without pain.     Cardiovascular:  Normal rate, regular rhythm, S1 normal, S2 normal, intact distal pulses and normal pulses.           Pulmonary/Chest: Breath sounds normal. No respiratory distress.   Abdominal: Abdomen is soft. Bowel sounds are normal. There is no abdominal tenderness.   Musculoskeletal:      Cervical back: Full passive range of motion without pain, normal range of motion and neck supple. No rigidity.      Right lower leg: No swelling. No edema.      Left lower leg: No swelling. No edema.      Comments: Pt left upper extremity in sling, no gross deformity appreciated.  2+ equal radial pulses bilaterally.  Compartments soft throughout the left upper extremity.     Neurological: He is alert and oriented to person, place, and time.   Skin: Skin is warm and dry. Capillary refill takes less than 2 seconds.         ED Course   Procedures  Labs Reviewed - No data to display       Imaging Results    None          Medications - No data to display  Medical Decision Making  Patient is a 34-year-old male presents to ER complaining of left elbow pain.    Patient is complaining of pain not being adequately controlled on medications he was sent home with during previous ER visit.  Patient's x-ray noted that a nondisplaced fracture of the radial head with the associated small level effusion can not be excluded.  Patient is a follow up with his PCP in a few days for repeat x-ray imaging.  Will prescribe short course of tramadol.  Patient encouraged to follow up with his PCP as instructed during  previous visit.  He is otherwise stable for discharge.  Strict return precautions given.    Neal Dougherty M.D.  Emergency Medicine        Risk  Prescription drug management.                                      Clinical Impression:  Final diagnoses:  [M25.522] Elbow pain, left (Primary)          ED Disposition Condition    Discharge Stable          ED Prescriptions       Medication Sig Dispense Start Date End Date Auth. Provider    traMADoL (ULTRAM) 50 mg tablet Take 1 tablet (50 mg total) by mouth every 8 (eight) hours as needed for Pain. 9 tablet 5/26/2025 -- Neal Dougherty MD          Follow-up Information       Follow up With Specialties Details Why Contact Info    Your Primary Care Provider  Schedule an appointment as soon as possible for a visit in 3 days For follow up and repeat XRay     [facility] - Emergency Dept Emergency Medicine  If symptoms worsen [address] [facility] [address] [facility] [address] [phone]                   [1]   Social History  Tobacco Use    Smoking status: Every Day     Types: Cigarettes    Smokeless tobacco: Never        Neal Dougherty MD  05/26/25 3940

## 2025-06-03 ENCOUNTER — HOSPITAL ENCOUNTER (EMERGENCY)
Facility: HOSPITAL | Age: 35
Discharge: HOME | End: 2025-06-03
Attending: SPECIALIST
Payer: COMMERCIAL

## 2025-06-03 VITALS
RESPIRATION RATE: 18 BRPM | HEIGHT: 65 IN | DIASTOLIC BLOOD PRESSURE: 81 MMHG | SYSTOLIC BLOOD PRESSURE: 140 MMHG | WEIGHT: 160 LBS | TEMPERATURE: 99 F | BODY MASS INDEX: 26.66 KG/M2 | HEART RATE: 94 BPM | OXYGEN SATURATION: 96 %

## 2025-06-03 DIAGNOSIS — S52.122K: Primary | ICD-10-CM

## 2025-06-03 DIAGNOSIS — M25.522 ELBOW PAIN, LEFT: ICD-10-CM

## 2025-06-03 DIAGNOSIS — S52.125D CLOSED NONDISPLACED FRACTURE OF HEAD OF LEFT RADIUS WITH ROUTINE HEALING, SUBSEQUENT ENCOUNTER: Primary | ICD-10-CM

## 2025-06-03 PROCEDURE — 99283 EMERGENCY DEPT VISIT LOW MDM: CPT | Mod: 25

## 2025-06-03 PROCEDURE — 29105 APPLICATION LONG ARM SPLINT: CPT | Mod: LT

## 2025-06-03 RX ORDER — HYDROCODONE BITARTRATE AND ACETAMINOPHEN 5; 325 MG/1; MG/1
1 TABLET ORAL EVERY 6 HOURS PRN
Qty: 15 TABLET | Refills: 0 | Status: SHIPPED | OUTPATIENT
Start: 2025-06-03

## 2025-06-03 NOTE — Clinical Note
"Kvng "Kvnga" Chevalier was seen and treated in our emergency department on 6/3/2025.  He may return to work on 06/10/2025.       If you have any questions or concerns, please don't hesitate to call.      Shahida Mondragon FNP"

## 2025-06-03 NOTE — DISCHARGE INSTRUCTIONS
Referral sent to [facility] ortho clinic, you should get a call   Wear splint  Ice to elbow  Hydrocodone as needed for pain, do not take and drive

## 2025-06-03 NOTE — ED PROVIDER NOTES
Encounter Date: 6/3/2025       History     Chief Complaint   Patient presents with    Medication Refill     Wants refill on his pain medication. Reports has a fractured left wrist and elbow. Velcro splint noted on left wrist. Reports he had a sling on his left arm but says he can't deal with that anymore. Does not have a follow up with orthopedic     34 year old male presents to ER complaining of left elbow pain and states he is out of pain meds. He was seen on 5/26 and had x-rays. He states he was called and told he needs to see ortho but hasn't received call. Review of chart recommended repeat x-rays due to questionable radial head fracture.     The history is provided by the patient. No  was used.     Review of patient's allergies indicates:  No Known Allergies  History reviewed. No pertinent past medical history.  History reviewed. No pertinent surgical history.  No family history on file.  Social History[1]  Review of Systems   Musculoskeletal:  Positive for arthralgias.   Skin:  Negative for color change and wound.   All other systems reviewed and are negative.      Physical Exam     Initial Vitals [06/03/25 1755]   BP Pulse Resp Temp SpO2   (!) 140/81 94 18 98.6 °F (37 °C) 96 %      MAP       --         Physical Exam    Nursing note and vitals reviewed.  Constitutional: He appears well-developed and well-nourished.   HENT:   Head: Normocephalic.   Eyes: EOM are normal.   Neck: Neck supple.   Cardiovascular:  Intact distal pulses.           Abdominal: He exhibits no distension.   Musculoskeletal:      Left elbow: No swelling, deformity or effusion. Normal range of motion. No tenderness.      Cervical back: Neck supple.     Neurological: He is alert and oriented to person, place, and time.   Skin: Skin is warm and dry. Capillary refill takes less than 2 seconds. No erythema.   Psychiatric: He has a normal mood and affect.         ED Course   Splint Application    Date/Time: 6/3/2025 6:37  PM    Performed by: Yee Martinez RN  Authorized by: Shahida Mondragon FNP  Consent Done: Yes  Consent: Verbal consent obtained  Risks and benefits: risks, benefits and alternatives were discussed  Consent given by: patient  Patient understanding: patient states understanding of the procedure being performed  Site marked: the operative site was marked  Imaging studies: imaging studies available  Patient identity confirmed: verbally with patient  Location details: left elbow  Splint type: sugar tong  Supplies used: cotton padding, Ortho-Glass and elastic bandage  Post-procedure: The splinted body part was neurovascularly unchanged following the procedure.  Patient tolerance: Patient tolerated the procedure well with no immediate complications        Labs Reviewed - No data to display       Imaging Results              X-Ray Elbow Complete Left (Final result)  Result time 06/03/25 18:29:20      Final result by Ryan Neumann MD (06/03/25 18:29:20)                   Impression:      Radial head small fractured fragments displaced anteriorly.      Electronically signed by: Ryan Neumann  Date:    06/03/2025  Time:    18:29               Narrative:    EXAMINATION:  XR ELBOW COMPLETE 3 VIEW LEFT    CLINICAL HISTORY:  Pain in left elbow    TECHNIQUE:  Three views    COMPARISON:  May 26, 2025    FINDINGS:  There are new radial head small fractured fragments which are displaced anteriorly.  Left elbow articular surfaces alignment is preserved.  Joint effusion is noted.                                       Medications - No data to display  Medical Decision Making  DDX: elbow contusion, closed fracture    34 year old male presents to ER complaining of left elbow pain and states he is out of pain meds. He was seen on 5/26 and had x-rays. He states he was called and told he needs to see ortho but hasn't received call. Review of chart recommended repeat x-rays due to questionable radial head fracture. Sugar tong splint  and referral sent to [facility].     Amount and/or Complexity of Data Reviewed  Radiology: ordered. Decision-making details documented in ED Course.                                      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 Provider    HYDROcodone-acetaminophen (NORCO) 5-325 mg per tablet Take 1 tablet by mouth every 6 (six) hours as needed for Pain. 15 tablet 6/3/2025 -- Shahida Mondragon FNP          Follow-up Information    None                [1]   Social History  Tobacco Use    Smoking status: Every Day     Types: Cigarettes    Smokeless tobacco: Never        Shahida Mondragon FNP  06/03/25 5842

## 2025-07-15 ENCOUNTER — HOSPITAL ENCOUNTER (EMERGENCY)
Facility: HOSPITAL | Age: 35
Discharge: HOME | End: 2025-07-15
Payer: COMMERCIAL

## 2025-07-15 VITALS
TEMPERATURE: 99 F | SYSTOLIC BLOOD PRESSURE: 131 MMHG | WEIGHT: 165 LBS | DIASTOLIC BLOOD PRESSURE: 79 MMHG | HEART RATE: 80 BPM | RESPIRATION RATE: 20 BRPM | OXYGEN SATURATION: 96 % | BODY MASS INDEX: 27.46 KG/M2

## 2025-07-15 DIAGNOSIS — K08.89 TOOTHACHE: ICD-10-CM

## 2025-07-15 DIAGNOSIS — K04.7 DENTAL ABSCESS: Primary | ICD-10-CM

## 2025-07-15 PROCEDURE — 99283 EMERGENCY DEPT VISIT LOW MDM: CPT

## 2025-07-15 PROCEDURE — 25000003 PHARM REV CODE 250

## 2025-07-15 RX ORDER — AMOXICILLIN 500 MG/1
1000 CAPSULE ORAL
Status: COMPLETED | OUTPATIENT
Start: 2025-07-15 | End: 2025-07-15

## 2025-07-15 RX ORDER — IBUPROFEN 800 MG/1
800 TABLET, FILM COATED ORAL
Status: COMPLETED | OUTPATIENT
Start: 2025-07-15 | End: 2025-07-15

## 2025-07-15 RX ORDER — AMOXICILLIN 500 MG/1
500 CAPSULE ORAL 3 TIMES DAILY
Qty: 15 CAPSULE | Refills: 0 | Status: SHIPPED | OUTPATIENT
Start: 2025-07-15 | End: 2025-07-20

## 2025-07-15 RX ADMIN — IBUPROFEN 800 MG: 800 TABLET, FILM COATED ORAL at 08:07

## 2025-07-15 RX ADMIN — AMOXICILLIN 1000 MG: 500 CAPSULE ORAL at 08:07

## 2025-07-15 NOTE — ED PROVIDER NOTES
Encounter Date: 7/15/2025       History     Chief Complaint   Patient presents with    Dental Problem     Pt states pain to upper rt tooth is painful with a hard knot noted     33 y/o w/o relevant pmhx states he has had upper right tooth pain for weeks, but worsening today with area of swelling and pain. He denies fevers or drainage. He has been treating with a topical preparation.        Review of patient's allergies indicates:  No Known Allergies  History reviewed. No pertinent past medical history.  History reviewed. No pertinent surgical history.  No family history on file.  Social History[1]  Review of Systems   Constitutional:  Negative for fever.   HENT:  Positive for dental problem. Negative for facial swelling and sore throat.         No difficulty swallowing or opening mouth     All other systems reviewed and are negative.      Physical Exam     Initial Vitals [07/15/25 0832]   BP Pulse Resp Temp SpO2   131/79 80 20 98.6 °F (37 °C) 96 %      MAP       --         Physical Exam    Constitutional: He appears well-developed and well-nourished.   HENT:   Head: Normocephalic and atraumatic. Mouth/Throat: Oropharynx is clear and moist. No oropharyngeal exudate.   Able to open mouth, manage secretions  Poor dentition  Area of erythema, swelling, tenderness to upper right molars   Eyes: EOM are normal.   Neck: Neck supple.   Normal range of motion.  Cardiovascular:  Normal rate, regular rhythm and normal heart sounds.           No murmur heard.  Pulmonary/Chest: Breath sounds normal. No respiratory distress. He has no wheezes. He has no rhonchi. He has no rales.   Musculoskeletal:         General: Normal range of motion.      Cervical back: Normal range of motion and neck supple.     Lymphadenopathy:     He has no cervical adenopathy.   Neurological: He is alert and oriented to person, place, and time. GCS score is 15. GCS eye subscore is 4. GCS verbal subscore is 5. GCS motor subscore is 6.         ED Course    Procedures  Labs Reviewed - No data to display       Imaging Results    None          Medications   ibuprofen tablet 800 mg (800 mg Oral Given 7/15/25 0833)   amoxicillin capsule 1,000 mg (1,000 mg Oral Given 7/15/25 0833)     Medical Decision Making  35 y/o w/o relevant pmhx states he has had upper right tooth pain for weeks, but worsening today with area of swelling and pain. He denies fevers or drainage. He has been treating with a topical preparation.  VSS  Exam notable for Able to open mouth, manage secretions  Poor dentition  Area of erythema, swelling, tenderness to upper right molars  Treat w/NSAIDs, amoxil  Patient searching for DDS appointment  Stable for discharge.    Risk  Prescription drug management.                                          Clinical Impression:  Final diagnoses:  [K04.7] Dental abscess (Primary)  [K08.89] Toothache          ED Disposition Condition    Discharge Stable          ED Prescriptions       Medication Sig Dispense Start Date End Date Auth. Provider    amoxicillin (AMOXIL) 500 MG capsule Take 1 capsule (500 mg total) by mouth 3 (three) times daily. for 5 days 15 capsule 7/15/2025 7/20/2025 Ollie Calhoun MD          Follow-up Information    None                [1]   Social History  Tobacco Use    Smoking status: Every Day     Types: Cigarettes    Smokeless tobacco: Never        Ollie Calhoun MD  07/15/25 0890

## 2025-07-15 NOTE — DISCHARGE INSTRUCTIONS
Continue with topical treatment, Advil or Tylenol as directed for pain and be sure to follow up with a Dentist. Return if your symptoms severely worsen.

## 2025-07-15 NOTE — Clinical Note
"Kvng Palacioa" Chevalier was seen and treated in our emergency department on 7/15/2025.  He may return to work on 07/16/2025.       If you have any questions or concerns, please don't hesitate to call.      Dr. Nicholson/PG RN    "